# Patient Record
Sex: FEMALE | URBAN - NONMETROPOLITAN AREA
[De-identification: names, ages, dates, MRNs, and addresses within clinical notes are randomized per-mention and may not be internally consistent; named-entity substitution may affect disease eponyms.]

---

## 2023-07-19 ENCOUNTER — APPOINTMENT (RX ONLY)
Dept: URBAN - NONMETROPOLITAN AREA CLINIC 31 | Facility: CLINIC | Age: 53
Setting detail: DERMATOLOGY
End: 2023-07-19

## 2023-07-19 DIAGNOSIS — D18.0 HEMANGIOMA: ICD-10-CM

## 2023-07-19 DIAGNOSIS — D22 MELANOCYTIC NEVI: ICD-10-CM

## 2023-07-19 DIAGNOSIS — L82.1 OTHER SEBORRHEIC KERATOSIS: ICD-10-CM

## 2023-07-19 DIAGNOSIS — L81.4 OTHER MELANIN HYPERPIGMENTATION: ICD-10-CM

## 2023-07-19 PROBLEM — D18.01 HEMANGIOMA OF SKIN AND SUBCUTANEOUS TISSUE: Status: ACTIVE | Noted: 2023-07-19

## 2023-07-19 PROBLEM — D22.5 MELANOCYTIC NEVI OF TRUNK: Status: ACTIVE | Noted: 2023-07-19

## 2023-07-19 PROCEDURE — ? SUNSCREEN RECOMMENDATIONS

## 2023-07-19 PROCEDURE — ? COUNSELING

## 2023-07-19 PROCEDURE — 99203 OFFICE O/P NEW LOW 30 MIN: CPT

## 2023-07-19 PROCEDURE — ? FULL BODY SKIN EXAM

## 2023-07-19 ASSESSMENT — LOCATION SIMPLE DESCRIPTION DERM
LOCATION SIMPLE: RIGHT SHOULDER
LOCATION SIMPLE: ABDOMEN
LOCATION SIMPLE: RIGHT UPPER BACK

## 2023-07-19 ASSESSMENT — LOCATION ZONE DERM
LOCATION ZONE: ARM
LOCATION ZONE: TRUNK

## 2023-07-19 ASSESSMENT — LOCATION DETAILED DESCRIPTION DERM
LOCATION DETAILED: RIGHT MID-UPPER BACK
LOCATION DETAILED: RIGHT POSTERIOR SHOULDER
LOCATION DETAILED: RIGHT MEDIAL UPPER BACK
LOCATION DETAILED: XIPHOID

## 2023-07-19 NOTE — HPI: HISTORY OF BASAL CELL CARCINOMA
What Is The Reason For Today's Visit?: History of Basal Cell Carcinoma
How Many Bccs Have You Had?: one
When Was Your Last Cancer Diagnosed?: 15 years ago

## 2024-07-24 ENCOUNTER — APPOINTMENT (RX ONLY)
Dept: URBAN - NONMETROPOLITAN AREA CLINIC 31 | Facility: CLINIC | Age: 54
Setting detail: DERMATOLOGY
End: 2024-07-24

## 2024-07-24 DIAGNOSIS — D18.0 HEMANGIOMA: ICD-10-CM

## 2024-07-24 DIAGNOSIS — L81.4 OTHER MELANIN HYPERPIGMENTATION: ICD-10-CM

## 2024-07-24 DIAGNOSIS — L82.1 OTHER SEBORRHEIC KERATOSIS: ICD-10-CM

## 2024-07-24 DIAGNOSIS — D22 MELANOCYTIC NEVI: ICD-10-CM

## 2024-07-24 PROBLEM — D18.01 HEMANGIOMA OF SKIN AND SUBCUTANEOUS TISSUE: Status: ACTIVE | Noted: 2024-07-24

## 2024-07-24 PROBLEM — D22.5 MELANOCYTIC NEVI OF TRUNK: Status: ACTIVE | Noted: 2024-07-24

## 2024-07-24 PROCEDURE — ? FULL BODY SKIN EXAM

## 2024-07-24 PROCEDURE — 99213 OFFICE O/P EST LOW 20 MIN: CPT

## 2024-07-24 PROCEDURE — ? SUNSCREEN RECOMMENDATIONS

## 2024-07-24 PROCEDURE — ? COUNSELING

## 2024-07-24 ASSESSMENT — LOCATION ZONE DERM
LOCATION ZONE: ARM
LOCATION ZONE: TRUNK

## 2024-07-24 ASSESSMENT — LOCATION SIMPLE DESCRIPTION DERM
LOCATION SIMPLE: RIGHT UPPER BACK
LOCATION SIMPLE: RIGHT SHOULDER
LOCATION SIMPLE: ABDOMEN

## 2024-07-24 ASSESSMENT — LOCATION DETAILED DESCRIPTION DERM
LOCATION DETAILED: XIPHOID
LOCATION DETAILED: RIGHT MEDIAL UPPER BACK
LOCATION DETAILED: RIGHT MID-UPPER BACK
LOCATION DETAILED: RIGHT POSTERIOR SHOULDER